# Patient Record
Sex: MALE | Race: WHITE | URBAN - METROPOLITAN AREA
[De-identification: names, ages, dates, MRNs, and addresses within clinical notes are randomized per-mention and may not be internally consistent; named-entity substitution may affect disease eponyms.]

---

## 2017-02-22 ENCOUNTER — OFFICE VISIT - HEALTHEAST (OUTPATIENT)
Dept: FAMILY MEDICINE | Facility: CLINIC | Age: 27
End: 2017-02-22

## 2017-02-22 DIAGNOSIS — M62.08 DIASTASIS RECTI: ICD-10-CM

## 2017-02-22 RX ORDER — CETIRIZINE HYDROCHLORIDE 10 MG/1
10 TABLET ORAL DAILY
Status: SHIPPED | COMMUNITY
Start: 2017-02-22

## 2017-02-22 ASSESSMENT — MIFFLIN-ST. JEOR: SCORE: 1890.69

## 2017-11-15 ENCOUNTER — OFFICE VISIT (OUTPATIENT)
Dept: FAMILY MEDICINE | Facility: CLINIC | Age: 27
End: 2017-11-15
Payer: COMMERCIAL

## 2017-11-15 VITALS
OXYGEN SATURATION: 99 % | HEART RATE: 82 BPM | SYSTOLIC BLOOD PRESSURE: 115 MMHG | HEIGHT: 74 IN | TEMPERATURE: 98.3 F | WEIGHT: 180 LBS | DIASTOLIC BLOOD PRESSURE: 69 MMHG | BODY MASS INDEX: 23.1 KG/M2

## 2017-11-15 DIAGNOSIS — Z00.00 ROUTINE HISTORY AND PHYSICAL EXAMINATION OF ADULT: Primary | ICD-10-CM

## 2017-11-15 PROCEDURE — 99385 PREV VISIT NEW AGE 18-39: CPT | Performed by: INTERNAL MEDICINE

## 2017-11-15 ASSESSMENT — ENCOUNTER SYMPTOMS
SLEEP DISTURBANCE: 0
BACK PAIN: 0
LIGHT-HEADEDNESS: 0
NERVOUS/ANXIOUS: 0
NECK PAIN: 0
DIARRHEA: 0
VOMITING: 0
SORE THROAT: 0
CONSTIPATION: 0
ARTHRALGIAS: 0
FEVER: 0
FATIGUE: 0
TROUBLE SWALLOWING: 0
ABDOMINAL PAIN: 0
NUMBNESS: 0
HEADACHES: 0
MYALGIAS: 0
PALPITATIONS: 0
BLOOD IN STOOL: 0
CHILLS: 0
SHORTNESS OF BREATH: 0
DIZZINESS: 0
COUGH: 0
NAUSEA: 0
EYE PAIN: 0
DIFFICULTY URINATING: 0

## 2017-11-15 NOTE — PROGRESS NOTES
SUBJECTIVE:   CC: Pablo Walsh is an 27 year old male who presents for preventative health visit.       Needs adoption papers filled.      Healthy Habits:    Do you get at least three servings of calcium containing foods daily (dairy, green leafy vegetables, etc.)? yes    Amount of exercise or daily activities, outside of work: 5 day(s) per week DURATION 30-40 MINS  Problems taking medications regularly No    Medication side effects: No    Have you had an eye exam in the past two years? yes    Do you see a dentist twice per year? ANNUALLY     Do you have sleep apnea, excessive snoring or daytime drowsiness?no      Today's PHQ-2 Score: 0    Abuse: Current or Past(Physical, Sexual or Emotional)- NO  Do you feel safe in your environment - YES  Social History   Substance Use Topics     Smoking status: Never Smoker     Smokeless tobacco: Never Used     Alcohol use Yes     The patient does not drink >3 drinks per day nor >7 drinks per week.    Last PSA: No results found for: PSA    Reviewed orders with patient. Reviewed health maintenance and updated orders accordingly - Yes    Reviewed and updated as needed this visit by clinical staff  Tobacco  Allergies  Meds  Problems  Soc Hx        Reviewed and updated as needed this visit by Provider  Allergies  Meds  Problems        Past Medical History:   Diagnosis Date     NO ACTIVE PROBLEMS        Past Surgical History:   Procedure Laterality Date     NO HISTORY OF SURGERY         Family History   Problem Relation Age of Onset     Hypertension Father      Other Cancer Maternal Grandfather      PANCREATIC CA     Other Cancer Paternal Grandfather      LEUKEMIA     DIABETES No family hx of      Coronary Artery Disease No family hx of      CEREBROVASCULAR DISEASE No family hx of      Colon Cancer No family hx of      Prostate Cancer No family hx of        Social History   Substance Use Topics     Smoking status: Never Smoker     Smokeless tobacco: Never Used     Alcohol use  "Yes         ROS:  Review of Systems   Constitutional: Negative for chills, fatigue and fever.   HENT: Negative for congestion, ear pain, hearing loss, sore throat and trouble swallowing.    Eyes: Negative for pain and visual disturbance.   Respiratory: Negative for cough and shortness of breath.    Cardiovascular: Negative for chest pain and palpitations.   Gastrointestinal: Negative for abdominal pain, blood in stool, constipation, diarrhea, nausea and vomiting.   Genitourinary: Negative for difficulty urinating and testicular pain.   Musculoskeletal: Negative for arthralgias, back pain, myalgias and neck pain.   Skin: Negative for rash.   Neurological: Negative for dizziness, light-headedness, numbness and headaches.   Psychiatric/Behavioral: Negative for sleep disturbance. The patient is not nervous/anxious.        OBJECTIVE:   /69 (BP Location: Right arm, Patient Position: Sitting, Cuff Size: Adult Regular)  Pulse 82  Temp 98.3  F (36.8  C) (Oral)  Ht 6' 1.5\" (1.867 m)  Wt 180 lb (81.6 kg)  SpO2 99%  BMI 23.43 kg/m2  EXAM:  Physical Exam   Constitutional: He is oriented to person, place, and time. No distress.   HENT:   Right Ear: External ear normal.   Left Ear: External ear normal.   Nose: Nose normal.   Mouth/Throat: Oropharynx is clear and moist. No oropharyngeal exudate.   Eyes: Conjunctivae are normal. Pupils are equal, round, and reactive to light.   Neck: Normal range of motion. Neck supple. No thyromegaly present.   Cardiovascular: Normal rate, regular rhythm and normal heart sounds.    Pulmonary/Chest: Effort normal and breath sounds normal. No respiratory distress.   Abdominal: Soft. There is no tenderness.   Genitourinary: Penis normal.   Musculoskeletal: Normal range of motion. He exhibits no edema or tenderness.   Lymphadenopathy:     He has no cervical adenopathy.   Neurological: He is alert and oriented to person, place, and time. He has normal reflexes. Coordination normal. " "  Psychiatric: He has a normal mood and affect. Judgment normal.   Vitals reviewed.      ASSESSMENT/PLAN:       ICD-10-CM    1. Routine history and physical examination of adult Z00.00      **please refer to HPI for status of conditions      Patient Instructions   Maintain low fat/calorie diet and regular exercise.    Follow up as needed.      Preventive Health Recommendations  Male Ages 26 - 39    Yearly exam:             See your health care provider every year in order to  o   Review health changes.   o   Discuss preventive care.    o   Review your medicines if your doctor has prescribed any.    You should be tested each year for STDs (sexually transmitted diseases), if you re at risk.     After age 35, talk to your provider about cholesterol testing. If you are at risk for heart disease, have your cholesterol tested at least every 5 years.     If you are at risk for diabetes, you should have a diabetes test (fasting glucose).  Shots: Get a flu shot each year. Get a tetanus shot every 10 years.     Nutrition:    Eat at least 5 servings of fruits and vegetables daily.     Eat whole-grain bread, whole-wheat pasta and brown rice instead of white grains and rice.     Talk to your provider about Calcium and Vitamin D.     Lifestyle    Exercise for at least 150 minutes a week (30 minutes a day, 5 days a week). This will help you control your weight and prevent disease.     Limit alcohol to one drink per day.     No smoking.     Wear sunscreen to prevent skin cancer.     See your dentist every six months for an exam and cleaning.       COUNSELING:  Reviewed preventive health counseling, as reflected in patient instructions       reports that he has never smoked. He has never used smokeless tobacco.    Estimated body mass index is 23.43 kg/(m^2) as calculated from the following:    Height as of this encounter: 6' 1.5\" (1.867 m).    Weight as of this encounter: 180 lb (81.6 kg).     Counseling Resources:  ATP IV " Guidelines  Pooled Cohorts Equation Calculator  FRAX Risk Assessment  ICSI Preventive Guidelines  Dietary Guidelines for Americans, 2010  [employer] MyPlate  ASA Prophylaxis  Lung CA Screening    Ulises Gabriel MD  [facility]

## 2017-11-15 NOTE — LETTER
November 15, 2017      To whom it may concern,      Please be informed that Mr. Pablo Walsh is physically and mentally fit to bear the responsibilities of child adoption and care.  He is also free from communicable disease.    Please call if you have any questions or concerns.          Dr. Ulises Gabriel

## 2017-11-15 NOTE — MR AVS SNAPSHOT
After Visit Summary   11/15/2017    Pablo Walsh    MRN: [id number]           Patient Information     Date Of Birth          1990        Visit Information        Provider Department      11/15/2017 2:30 PM Ulises Gabriel MD [facility]        Today's Diagnoses     Routine history and physical examination of adult    -  1      Care Instructions    Maintain low fat/calorie diet and regular exercise.    Follow up as needed.      Preventive Health Recommendations  Male Ages 26 - 39    Yearly exam:             See your health care provider every year in order to  o   Review health changes.   o   Discuss preventive care.    o   Review your medicines if your doctor has prescribed any.    You should be tested each year for STDs (sexually transmitted diseases), if you re at risk.     After age 35, talk to your provider about cholesterol testing. If you are at risk for heart disease, have your cholesterol tested at least every 5 years.     If you are at risk for diabetes, you should have a diabetes test (fasting glucose).  Shots: Get a flu shot each year. Get a tetanus shot every 10 years.     Nutrition:    Eat at least 5 servings of fruits and vegetables daily.     Eat whole-grain bread, whole-wheat pasta and brown rice instead of white grains and rice.     Talk to your provider about Calcium and Vitamin D.     Lifestyle    Exercise for at least 150 minutes a week (30 minutes a day, 5 days a week). This will help you control your weight and prevent disease.     Limit alcohol to one drink per day.     No smoking.     Wear sunscreen to prevent skin cancer.     See your dentist every six months for an exam and cleaning.             Follow-ups after your visit        Who to contact     If you have questions or need follow up information about today's clinic visit or your schedule please contact [facility] directly at [phone].  Normal or non-critical lab and imaging  "results will be communicated to you by MyChart, letter or phone within 4 business days after the clinic has received the results. If you do not hear from us within 7 days, please contact the clinic through [employer] or phone. If you have a critical or abnormal lab result, we will notify you by phone as soon as possible.  Submit refill requests through [employer] or call your pharmacy and they will forward the refill request to us. Please allow 3 business days for your refill to be completed.          Additional Information About Your Visit        [employer] Information     [employer] lets you send messages to your doctor, view your test results, renew your prescriptions, schedule appointments and more. To sign up, go to [email or web address] . Click on \"Log in\" on the left side of the screen, which will take you to the Welcome page. Then click on \"Sign up Now\" on the right side of the page.     You will be asked to enter the access code listed below, as well as some personal information. Please follow the directions to create your username and password.     Your access code is: [email or web address]  Expires: 2018  3:03 PM     Your access code will  in 90 days. If you need help or a new code, please call your [place] clinic or [phone].        Care EveryWhere ID     This is your Care EveryWhere ID. This could be used by other organizations to access your [place] medical records  [id number]        Your Vitals Were     Pulse Temperature Height Pulse Oximetry BMI (Body Mass Index)       82 98.3  F (36.8  C) (Oral) 6' 1.5\" (1.867 m) 99% 23.43 kg/m2        Blood Pressure from Last 3 Encounters:   11/15/17 115/69    Weight from Last 3 Encounters:   11/15/17 180 lb (81.6 kg)              Today, you had the following     No orders found for display       Primary Care Provider Office Phone # Fax #    Ulises Froilan Gabriel [phone] [address]  [place] [address]        Equal " Access to Services     [facility]: Hadii reji Gamez, wasreedhar thomas, rudykatharine de jesus. So [facility] [phone].    ATENCIÓN: Si habla español, tiene a reid disposición servicios gratuitos de asistencia lingüística. Llame al [phone].    We comply with applicable federal civil rights laws and [place] laws. We do not discriminate on the basis of race, color, national origin, age, disability, sex, sexual orientation, or gender identity.            Thank you!     Thank you for choosing [facility]  for your care. Our goal is always to provide you with excellent care. Hearing back from our patients is one way we can continue to improve our services. Please take a few minutes to complete the written survey that you may receive in the mail after your visit with us. Thank you!             Your Updated Medication List - Protect others around you: Learn how to safely use, store and throw away your medicines at www.disposemymeds.org.      Notice  As of 11/15/2017  3:03 PM    You have not been prescribed any medications.

## 2017-11-15 NOTE — PATIENT INSTRUCTIONS
Maintain low fat/calorie diet and regular exercise.    Follow up as needed.      Preventive Health Recommendations  Male Ages 26 - 39    Yearly exam:             See your health care provider every year in order to  o   Review health changes.   o   Discuss preventive care.    o   Review your medicines if your doctor has prescribed any.    You should be tested each year for STDs (sexually transmitted diseases), if you re at risk.     After age 35, talk to your provider about cholesterol testing. If you are at risk for heart disease, have your cholesterol tested at least every 5 years.     If you are at risk for diabetes, you should have a diabetes test (fasting glucose).  Shots: Get a flu shot each year. Get a tetanus shot every 10 years.     Nutrition:    Eat at least 5 servings of fruits and vegetables daily.     Eat whole-grain bread, whole-wheat pasta and brown rice instead of white grains and rice.     Talk to your provider about Calcium and Vitamin D.     Lifestyle    Exercise for at least 150 minutes a week (30 minutes a day, 5 days a week). This will help you control your weight and prevent disease.     Limit alcohol to one drink per day.     No smoking.     Wear sunscreen to prevent skin cancer.     See your dentist every six months for an exam and cleaning.

## 2017-11-15 NOTE — NURSING NOTE
"Chief Complaint   Patient presents with     New Patient     Physical       Initial /69 (BP Location: Right arm, Patient Position: Sitting, Cuff Size: Adult Regular)  Pulse 82  Temp 98.3  F (36.8  C) (Oral)  Ht 6' 1.5\" (1.867 m)  Wt 180 lb (81.6 kg)  SpO2 99%  BMI 23.43 kg/m2 Estimated body mass index is 23.43 kg/(m^2) as calculated from the following:    Height as of this encounter: 6' 1.5\" (1.867 m).    Weight as of this encounter: 180 lb (81.6 kg).  Medication Reconciliation: complete   Krystal Gill- CATRINA      "

## 2021-05-30 VITALS — BODY MASS INDEX: 25.47 KG/M2 | HEIGHT: 73 IN | WEIGHT: 192.2 LBS

## 2021-06-09 NOTE — PROGRESS NOTES
Assessment/Plan:     1. Diastasis recti  Ongoing without any acute abnormalities or signs of hernia.  Did discuss that if this was somewhat bothersome to patient we could perform CT or further imaging but at this point is not bothering him and likely would not need treatment.  Also discussed ongoing exercises to do and to avoid with his diastases recti.  He declines well care or lap update         Subjective:      Pablo Taylor is a 26 y.o. male visit today as a new patient with concerns over possible hernia.  He states that he has known to have a diastases recti since a youth.  He states that it does not particularly bother him.  He states fit performs a lot of abdomen core exercises.  He was working in sales at [employer].  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  He states he was lifting very heavy and bulky items and he noticed that when he lifted he felt a pain in his mid upper abdominal area.  He states that he did not notice any area particularly popping out but was wondering if he had a hernia through his diastases recti.  He has had no bowel change.  He states that he also had a bit of pain in the back.  He describes it as an achy it was not bad enough yet to take any medication for it.  He states that it has seemed to improve over the last couple weeks.  Now he is used to lifting heavy objects and he states he is using his knees more than his back and everything is going okay.  He otherwise feels well.  No chest pain shortness of breath bowel changes palpitations.  Has not recently seen primary care or have any labs but he declines at this time.  Declines flu vaccine.  No other new concerns today.    Current Outpatient Prescriptions   Medication Sig Dispense Refill     cetirizine (ZYRTEC) 10 MG tablet Take 10 mg by mouth daily.       No current facility-administered medications for this visit.        Past Medical History, Family History, and Social History reviewed.  History  "reviewed. No pertinent past medical history.  History reviewed. No pertinent surgical history.  Review of patient's allergies indicates no known allergies.  Family History   Problem Relation Age of Onset     Hypertension Father      Social History     Social History     Marital status:      Spouse name: N/A     Number of children: N/A     Years of education: N/A     Occupational History     Not on file.     Social History Main Topics     Smoking status: Never Smoker     Smokeless tobacco: Never Used     Alcohol use No     Drug use: No     Sexual activity: Not on file     Other Topics Concern     Not on file     Social History Narrative     No narrative on file         Review of systems is as stated in HPI, and the remainder of the 10 system review is otherwise negative.    Objective:     Vitals:    02/22/17 1430   BP: 128/60   Patient Site: Right Arm   Patient Position: Sitting   Cuff Size: Adult Regular   Pulse: 90   Resp: 16   SpO2: 100%   Weight: 192 lb 3.2 oz (87.2 kg)   Height: 6' 1\" (1.854 m)    Body mass index is 25.36 kg/(m^2).    General Appearance:    Alert, cooperative, no distress, appears stated age   Head:    Normocephalic, without obvious abnormality, atraumatic   Neck:   Supple, symmetrical, no adenopathy, no thyromegally       Lungs:     Clear to auscultation bilaterally, respirations unlabored    Heart:    Regular rate and rhythm, S1 and S2 normal, no murmur   Abdomen    Spine        Neuro:    patient has mild diastases recti without any signs of hernia, otherwise soft, non-tender no masses, no organomegaly    Lower thoracic somewhat rotated to the right with corresponding muscle spasm.  Otherwise cervical thoracic and lumbar normal alignment without significant rotation or abnormality.   ormal sensation reflexes upper extremities    Extremities:   Extremities normal, atraumatic, no cyanosis or edema   Skin:   No rashes or lesions         This note has been dictated using voice recognition " software. Any grammatical or context distortions are unintentional and inherent to the the software.